# Patient Record
Sex: MALE | Race: WHITE
[De-identification: names, ages, dates, MRNs, and addresses within clinical notes are randomized per-mention and may not be internally consistent; named-entity substitution may affect disease eponyms.]

---

## 2017-04-30 ENCOUNTER — HOSPITAL ENCOUNTER (EMERGENCY)
Dept: HOSPITAL 76 - ED | Age: 37
Discharge: HOME | End: 2017-04-30
Payer: COMMERCIAL

## 2017-04-30 VITALS — DIASTOLIC BLOOD PRESSURE: 86 MMHG | SYSTOLIC BLOOD PRESSURE: 138 MMHG

## 2017-04-30 DIAGNOSIS — T23.101A: Primary | ICD-10-CM
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
## 2017-04-30 DIAGNOSIS — R03.0: ICD-10-CM

## 2017-04-30 PROCEDURE — 99283 EMERGENCY DEPT VISIT LOW MDM: CPT

## 2017-04-30 NOTE — ED PHYSICIAN DOCUMENTATION
PD HPI MAJOR BURN





- Stated complaint


Stated Complaint: RT HAND INJURY





- Chief complaint


Chief Complaint: Burn





- History obtained from


History obtained from: Patient





- History of Present Illness


Timing - onset: Other (Right-handed gentleman, active duty in the [employer], spilled 

hot soup on his hand about an hour ago and has severe pain.  No other injuries.

  He is up-to-date on tetanus.)





Review of Systems


Constitutional: reports: Reviewed and negative


Nose: reports: Reviewed and negative


Throat: reports: Reviewed and negative





PD PAST MEDICAL HISTORY





- Present Medications


Home Medications: 


 Ambulatory Orders











 Medication  Instructions  Recorded  Confirmed


 


HYDROcod/ACETAM 5/325 [Norco 5/325] 1 - 2 ea PO Q6H PRN #15 tablet 04/30/17 


 


Rizatriptan Benzoate [Maxalt] 10 mg PO 04/30/17 














- Allergies


Allergies/Adverse Reactions: 


 Allergies











Allergy/AdvReac Type Severity Reaction Status Date / Time


 


No Known Drug Allergies Allergy   Verified 04/30/17 21:22














PD ED PE NORMAL





- Vitals


Vital signs reviewed: Yes





- General


General: Alert and oriented X 3, No acute distress





- Extremities


Extremities: Other (Small areas of first degree burn along the dorsal hand and 

the third finger palmar side, nothing circumferential.  TBSA much less than 1%.)





- Neuro


Neuro: Alert and oriented X 3, Normal speech





- Psych


Psych: Normal mood, Normal affect





Results





- Vitals


Vitals: 


 Vital Signs - 24 hr











  04/30/17





  21:19


 


Temperature 36.3 C L


 


Heart Rate 88


 


Respiratory 16





Rate 


 


Blood Pressure 138/86 H


 


O2 Saturation 96








 Oxygen











O2 Source                      Room air

















Departure





- Departure


Disposition: 01 Home, Self Care


Clinical Impression: 


Burn of hand


Qualifiers:


 Encounter type: initial encounter Laterality: right Burn degree: first degree 

Qualified Code(s): T23.101A - Burn of first degree of right hand, unspecified 

site, initial encounter


Condition: Good


Record reviewed to determine appropriate education?: Yes


Instructions:  ED Burn D 1st


Prescriptions: 


HYDROcod/ACETAM 5/325 [Norco 5/325] 1 - 2 ea PO Q6H PRN #15 tablet


 PRN Reason: Pain


Comments: 


Go to [facility] medical tomorrow to check in with your PCM.  Return if worse.





Your blood pressure was elevated today on check in to the emergency department. 

This does not mean that you have hypertension, it is a common phenomenon to 

check into the emergency department and have elevated blood pressure. I 

recommend that you see your primary care physician within the week to have it 

rechecked when you're feeling better.





Do not drink or drive while on narcotic pain medicine.


Note that many narcotic pain relievers also contain tylenol/acetaminophen. 

Please ensure that your total dose of acetaminophen from all sources does not 

exceed 3 grams (3000mg) per day.


You may constipated on this medication, take a stool softener such as "Colace" 

twice a day while you are on it. Also recommend a over-the-counter laxative 

such as senna or MiraLAX any day that you do not have a bowel movement.


If you received narcotic pain medication in the emergency department, do not 

drive or operate machinery for the next 24 hours.

## 2017-05-31 ENCOUNTER — HOSPITAL ENCOUNTER (OUTPATIENT)
Dept: HOSPITAL 76 - DI | Age: 37
Discharge: HOME | End: 2017-05-31
Attending: STUDENT IN AN ORGANIZED HEALTH CARE EDUCATION/TRAINING PROGRAM
Payer: COMMERCIAL

## 2017-05-31 DIAGNOSIS — R01.1: Primary | ICD-10-CM

## 2017-05-31 DIAGNOSIS — Z85.47: ICD-10-CM

## 2017-05-31 PROCEDURE — 93306 TTE W/DOPPLER COMPLETE: CPT

## 2017-08-06 ENCOUNTER — HOSPITAL ENCOUNTER (EMERGENCY)
Dept: HOSPITAL 76 - ED | Age: 37
Discharge: HOME | End: 2017-08-06
Payer: COMMERCIAL

## 2017-08-06 ENCOUNTER — HOSPITAL ENCOUNTER (OUTPATIENT)
Dept: HOSPITAL 76 - EMS | Age: 37
Discharge: ANOTHER HOSPITAL | End: 2017-08-06
Attending: SURGERY
Payer: COMMERCIAL

## 2017-08-06 VITALS — SYSTOLIC BLOOD PRESSURE: 124 MMHG | DIASTOLIC BLOOD PRESSURE: 79 MMHG

## 2017-08-06 DIAGNOSIS — R07.2: Primary | ICD-10-CM

## 2017-08-06 DIAGNOSIS — R07.9: Primary | ICD-10-CM

## 2017-08-06 DIAGNOSIS — R06.02: ICD-10-CM

## 2017-08-06 DIAGNOSIS — R94.31: ICD-10-CM

## 2017-08-06 DIAGNOSIS — J84.10: ICD-10-CM

## 2017-08-06 LAB
ALBUMIN/GLOB SERPL: 1.5 {RATIO} (ref 1–2.2)
ANION GAP SERPL CALCULATED.4IONS-SCNC: 12 MMOL/L (ref 6–13)
BASOPHILS NFR BLD AUTO: 0.1 10^3/UL (ref 0–0.1)
BASOPHILS NFR BLD AUTO: 0.8 %
BILIRUB BLD-MCNC: 0.9 MG/DL (ref 0.2–1)
BUN SERPL-MCNC: 16 MG/DL (ref 6–20)
CALCIUM UR-MCNC: 8.9 MG/DL (ref 8.5–10.3)
CHLORIDE SERPL-SCNC: 108 MMOL/L (ref 101–111)
CO2 SERPL-SCNC: 18 MMOL/L (ref 21–32)
CREAT SERPLBLD-SCNC: 0.7 MG/DL (ref 0.6–1.2)
EOSINOPHIL # BLD AUTO: 0.1 10^3/UL (ref 0–0.7)
EOSINOPHIL NFR BLD AUTO: 0.5 %
ERYTHROCYTE [DISTWIDTH] IN BLOOD BY AUTOMATED COUNT: 12.9 % (ref 12–15)
GFRSERPLBLD MDRD-ARVRAT: 126 ML/MIN/{1.73_M2} (ref 89–?)
GLOBULIN SER-MCNC: 2.8 G/DL (ref 2.1–4.2)
GLUCOSE SERPL-MCNC: 94 MG/DL (ref 70–100)
HCT VFR BLD AUTO: 42.9 % (ref 42–52)
HGB UR QL STRIP: 14.9 G/DL (ref 14–18)
LIPASE SERPL-CCNC: 34 U/L (ref 22–51)
LYMPHOCYTES # SPEC AUTO: 2.4 10^3/UL (ref 1.5–3.5)
LYMPHOCYTES NFR BLD AUTO: 23.4 %
MAGNESIUM SERPL-MCNC: 1.9 MG/DL (ref 1.7–2.8)
MCH RBC QN AUTO: 30.5 PG (ref 27–31)
MCHC RBC AUTO-ENTMCNC: 34.8 G/DL (ref 32–36)
MCV RBC AUTO: 87.7 FL (ref 80–94)
MONOCYTES # BLD AUTO: 1.1 10^3/UL (ref 0–1)
MONOCYTES NFR BLD AUTO: 10.9 %
NEUTROPHILS # BLD AUTO: 6.6 10^3/UL (ref 1.5–6.6)
NEUTROPHILS # SNV AUTO: 10.3 X10^3/UL (ref 4.8–10.8)
NEUTROPHILS NFR BLD AUTO: 64.4 %
NRBC # BLD AUTO: 0 /100WBC
PDW BLD AUTO: 7.3 FL (ref 7.4–11.4)
POTASSIUM SERPL-SCNC: 3.5 MMOL/L (ref 3.5–5)
PROT SPEC-MCNC: 6.9 G/DL (ref 6.7–8.2)
RBC MAR: 4.88 10^6/UL (ref 4.7–6.1)
SODIUM SERPLBLD-SCNC: 138 MMOL/L (ref 135–145)
WBC # BLD: 10.3 X10^3/UL

## 2017-08-06 PROCEDURE — 85025 COMPLETE CBC W/AUTO DIFF WBC: CPT

## 2017-08-06 PROCEDURE — 96374 THER/PROPH/DIAG INJ IV PUSH: CPT

## 2017-08-06 PROCEDURE — 93005 ELECTROCARDIOGRAM TRACING: CPT

## 2017-08-06 PROCEDURE — 96375 TX/PRO/DX INJ NEW DRUG ADDON: CPT

## 2017-08-06 PROCEDURE — 80053 COMPREHEN METABOLIC PANEL: CPT

## 2017-08-06 PROCEDURE — 85379 FIBRIN DEGRADATION QUANT: CPT

## 2017-08-06 PROCEDURE — 84484 ASSAY OF TROPONIN QUANT: CPT

## 2017-08-06 PROCEDURE — 83735 ASSAY OF MAGNESIUM: CPT

## 2017-08-06 PROCEDURE — 80320 DRUG SCREEN QUANTALCOHOLS: CPT

## 2017-08-06 PROCEDURE — 83690 ASSAY OF LIPASE: CPT

## 2017-08-06 PROCEDURE — 99284 EMERGENCY DEPT VISIT MOD MDM: CPT

## 2017-08-06 PROCEDURE — 94640 AIRWAY INHALATION TREATMENT: CPT

## 2017-08-06 PROCEDURE — 36415 COLL VENOUS BLD VENIPUNCTURE: CPT

## 2017-08-06 PROCEDURE — 71020: CPT

## 2017-08-06 NOTE — ED PHYSICIAN DOCUMENTATION
PD HPI CHEST PAIN





- Stated complaint


Stated Complaint: CP





- Chief complaint


Chief Complaint: Resp





- History obtained from


History obtained from: Patient, EMS





- History of Present Illness


Timing - onset: How many hours ago (few), Today


Timing - onset during: Rest (he was sitting at home and felt onset of 

substernal chest pressure/pain along wiht dyspnea. Feeling similar to some 

dyspnea and pains he will get with his fibrosis, but much worse than prior.)


Timing - duration: Hours (few)


Timing - details: Abrupt onset, Still present, Waxing and waning


Quality: Pressure, Tightness.  No: Sharp, Tearing


Location: Substernal.  No: Epigastric


Radiation: No: Neck, Back, Abdominal


Improved by: No: Rest


Worsened by: Movement, Position.  No: Inspiration, Palpation


Associated symptoms: Shortness of air, Cough (which he has chronically).  No: 

Nausea, Vomiting, Feeling faint / dizzy, Palpitations


Similar symptoms before: Diagnosis (has dyspnea commonly due to fibrosis (from 

Bleomycin chemo years ago))


Recently seen: Not recently seen





Review of Systems


Constitutional: reports: Fatigue.  denies: Fever, Chills, Myalgias


Nose: denies: Rhinorrhea / runny nose, Congestion


Throat: denies: Sore throat


Respiratory: reports: Cough


GI: denies: Abdominal Pain, Nausea, Vomiting, Diarrhea


Skin: denies: Rash, Lesions


Neurologic: denies: Focal weakness, Numbness, Near syncope





PD PAST MEDICAL HISTORY





- Past Medical History


Past Medical History: Yes


Cardiovascular: None


Respiratory: Other


Neuro: Headache/migraine


Endocrine/Autoimmune: None


GI: None


: None


HEENT: None


Psych: None


Musculoskeletal: None


Derm: None


Other Past Medical History: pulmonary fibrosis





- Past Surgical History


Past Surgical History: Yes





- Present Medications


Home Medications: 


 Ambulatory Orders











 Medication  Instructions  Recorded  Confirmed


 


HYDROcod/ACETAM 5/325 [Norco 5/325] 1 - 2 ea PO Q6H PRN #15 tablet 04/30/17 


 


Rizatriptan Benzoate [Maxalt] 10 mg PO 04/30/17 


 


HYDROcod/ACETAM 5/325 [Norco 5/325] 1 tab PO Q6H PRN #15 tablet 08/06/17 














- Allergies


Allergies/Adverse Reactions: 


 Allergies











Allergy/AdvReac Type Severity Reaction Status Date / Time


 


No Known Drug Allergies Allergy   Verified 04/30/17 21:22














- Social History


Does the pt smoke?: No


Smoking Status: Never smoker


Does the pt drink ETOH?: Yes


Does the pt have substance abuse?: No





- Family History


Family history: denies: CAD, Aortic aneursym, Aortic dissection





- Immunizations


Immunizations are current?: Yes





PD ED PE NORMAL





- Vitals


Vital signs reviewed: Yes





- General


General: Alert and oriented X 3, Well developed/nourished





- HEENT


HEENT: Pharynx benign





- Neck


Neck: Supple, no meningeal sign, No adenopathy





- Cardiac


Cardiac: RRR, No murmur





- Respiratory


Respiratory: Clear bilaterally, Other (no chestwall tenderness)





- Abdomen


Abdomen: Soft, Non distended, Other (tender along old prior scar midabdomen, 

which he says is chronic since his surgery years ago. )





- Male 


Male : Deferred





- Rectal


Rectal: Deferred





- Back


Back: No CVA TTP





- Derm


Derm: Normal color, Warm and dry





- Extremities


Extremities: No tenderness to palpate, Normal ROM s pain, No edema, No calf 

tenderness / cord





- Neuro


Neuro: Alert and oriented X 3, No motor deficit, Normal speech





- Psych


Psych: Normal mood





Results





- Vitals


Vitals: 


 Vital Signs - 24 hr











  08/06/17 08/06/17 08/06/17





  01:46 02:39 03:00


 


Temperature 36.4 C L  


 


Heart Rate 111 H 87 108 H


 


Respiratory 22 20 22





Rate   


 


Blood Pressure 145/88 H 136/78 H 


 


O2 Saturation 96 95 














  08/06/17





  04:46


 


Temperature 


 


Heart Rate 101 H


 


Respiratory 20





Rate 


 


Blood Pressure 124/79


 


O2 Saturation 98








 Oxygen











O2 Source                      Room air


 


Oxygen Flow Rate               3

















- EKG (time done)


  ** 01:51


Rate: Rate (enter#) (97)


Rhythm: NSR


Axis: Normal


Intervals: Normal VT


QRS: Poor R wave progression


Ischemia: Normal ST segments.  No: ST elevation c/w ischemia, ST depression





- Labs


Labs: 


 Laboratory Tests











  08/06/17 08/06/17 08/06/17





  02:25 02:55 02:55


 


WBC   10.3 


 


RBC   4.88 


 


Hgb   14.9 


 


Hct   42.9 


 


MCV   87.7 


 


MCH   30.5 


 


MCHC   34.8 


 


RDW   12.9 


 


Plt Count   197 


 


MPV   7.3 L 


 


Neut #   6.6 


 


Lymph #   2.4 


 


Mono #   1.1 H 


 


Eos #   0.1 


 


Baso #   0.1 


 


Absolute Nucleated RBC   0.01 


 


Nucleated RBCs   0.0 


 


D-Dimer  < 200.0 L  


 


Sodium    138


 


Potassium    3.5


 


Chloride    108


 


Carbon Dioxide    18 L


 


Anion Gap    12.0


 


BUN    16


 


Creatinine    0.7


 


Estimated GFR (MDRD)    126


 


Glucose    94


 


Calcium    8.9


 


Magnesium    1.9


 


Total Bilirubin    0.9


 


AST    139 H


 


ALT    39


 


Alkaline Phosphatase    49


 


Troponin I   


 


Total Protein    6.9


 


Albumin    4.1


 


Globulin    2.8


 


Albumin/Globulin Ratio    1.5


 


Lipase    34


 


Ethyl Alcohol    175.7














  08/06/17





  02:55


 


WBC 


 


RBC 


 


Hgb 


 


Hct 


 


MCV 


 


MCH 


 


MCHC 


 


RDW 


 


Plt Count 


 


MPV 


 


Neut # 


 


Lymph # 


 


Mono # 


 


Eos # 


 


Baso # 


 


Absolute Nucleated RBC 


 


Nucleated RBCs 


 


D-Dimer 


 


Sodium 


 


Potassium 


 


Chloride 


 


Carbon Dioxide 


 


Anion Gap 


 


BUN 


 


Creatinine 


 


Estimated GFR (MDRD) 


 


Glucose 


 


Calcium 


 


Magnesium 


 


Total Bilirubin 


 


AST 


 


ALT 


 


Alkaline Phosphatase 


 


Troponin I  < 0.04


 


Total Protein 


 


Albumin 


 


Globulin 


 


Albumin/Globulin Ratio 


 


Lipase 


 


Ethyl Alcohol 














- Rads (name of study)


  ** chest


Radiology: Prelim report reviewed (normal)





PD MEDICAL DECISION MAKING





- ED course


Complexity details: reviewed results, re-evaluated patient (pain improved with 

Morphine for awhile, then returned. Hurts for him to sit up and walking 

increases the pain, twisting, but not just breathing. ), considered 

differential (No acute changes on ECG and troponin is normal at few hours after 

onset. CXR clear. No change with neb treatment in his dyspnea. Mylanta not 

helpful. Lipase is okay. D-dimer is negative. I don't have indication for 

threatening cause of the symptoms. ), d/w patient





Departure





- Departure


Disposition: 01 Home, Self Care


Clinical Impression: 


Chest pain


Qualifiers:


 Chest pain type: precordial pain Qualified Code(s): R07.2 - Precordial pain


Condition: Stable


Record reviewed to determine appropriate education?: Yes


Instructions:  ED Chest Pain Atypical Unkn Cause


Follow-Up: 


KISHA VO [Primary Care Provider] - 


Prescriptions: 


HYDROcod/ACETAM 5/325 [Norco 5/325] 1 tab PO Q6H PRN #15 tablet


 PRN Reason: Pain


Comments: 


No obvious threatening/dangerous cause of the pain, but I don't have the 

diagnosis per se. Tylenol or hydrocodone as needed for pain. Recheck with PMD 

if not improved over the next 1-2 days. Return to ED if worse or other symptoms 

develop along with it. 


Forms:  Activity restrictions


Discharge Date/Time: 08/06/17 04:46

## 2017-08-06 NOTE — XRAY PRELIMINARY REPORT
Accession: [id number]

Exam: XR Chest 2 View PA/LAT

 

IMPRESSION: Normal 2-view chest radiography.

 

[facility]

 

SITE ID: [id number]

## 2017-08-06 NOTE — XRAY REPORT
EXAM:

CHEST RADIOGRAPHY

 

EXAM DATE: 8/6/2017 02:34 AM.

 

CLINICAL HISTORY: Anterior chest pain.

 

COMPARISON: None.

 

TECHNIQUE: 2 views.

 

FINDINGS: 

Lungs/Pleura: No focal opacities evident. No pleural effusion. No pneumothorax. Normal volumes.

 

Mediastinum: Heart and mediastinal contours are unremarkable.

 

Other: None.

 

IMPRESSION: Normal 2-view chest radiography.

 

[facility]

Referring Provider Line: [phone]

 

SITE ID: [id number]

## 2017-09-28 ENCOUNTER — HOSPITAL ENCOUNTER (EMERGENCY)
Dept: HOSPITAL 76 - ED | Age: 37
Discharge: HOME | End: 2017-09-28
Payer: COMMERCIAL

## 2017-09-28 VITALS — DIASTOLIC BLOOD PRESSURE: 82 MMHG | SYSTOLIC BLOOD PRESSURE: 120 MMHG

## 2017-09-28 DIAGNOSIS — S40.021A: Primary | ICD-10-CM

## 2017-09-28 DIAGNOSIS — C62.90: ICD-10-CM
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
## 2017-09-28 DIAGNOSIS — J84.10: ICD-10-CM
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
## 2017-09-28 PROCEDURE — 99283 EMERGENCY DEPT VISIT LOW MDM: CPT

## 2017-09-28 PROCEDURE — 73000 X-RAY EXAM OF COLLAR BONE: CPT

## 2017-09-28 NOTE — XRAY REPORT
EXAM:

RIGHT CLAVICLE RADIOGRAPHY

 

EXAM DATE: 9/28/2017 05:49 AM.

 

CLINICAL HISTORY: Fall, right-sided clavicle pain.

 

COMPARISON: 08/06/2017.

 

TECHNIQUE: 2 views.

 

FINDINGS: 

Bones: Normal. No fracture or bone lesion.

 

Joints: The acromioclavicular and sternoclavicular joints are normal. No subluxation.

 

Soft Tissues: Normal. No soft tissue swelling.

 

IMPRESSION: Normal clavicle radiography.

 

[facility]

Referring Provider Line: [phone]

 

SITE ID: [id number]

## 2017-09-28 NOTE — XRAY PRELIMINARY REPORT
Accession: [id number]

Exam: XR Clavicle RT

 

IMPRESSION: Normal clavicle radiography.

 

[facility]

 

SITE ID: [id number]

## 2017-09-28 NOTE — ED PHYSICIAN DOCUMENTATION
PD HPI UPPER EXT INJURY





- Stated complaint


Stated Complaint: FALL





- Chief complaint


Chief Complaint: Ext Problem





- History obtained from


History obtained from: Patient





- History of Present Illness


Location: Right, Clavicle


Type of injury: Fall


Where injury occurred: Home


Timing - onset: How many minutes ago (30)


Timing - duration: Seconds


Timing - details: Abrupt onset


Improved by: Immobilization


Worsened by: Moving, Palpating


Associated symptoms: No: Numbness, Tingling


Similar symptoms before: Diagnosis


Recently seen: Not recently seen





- Additonal information


Additional information: 





Patient is a 37 year old male presenting to the emergency department for arm 

pain.  Patient states that he fell out of bed landing on the right side.  

Patient now has right sided clavicle pain





Review of Systems


Eyes: denies: Loss of vision


Ears: denies: Ear pain, Drainage/discharge


Nose: denies: Epistaxis


Throat: denies: Dental pain / toothache


Cardiac: denies: Chest pain / pressure


Respiratory: denies: Dyspnea


GI: denies: Nausea, Vomiting


Musculoskeletal: reports: Extremity pain.  denies: Extremity swelling, Joint 

swelling


Neurologic: denies: Focal weakness, Numbness, Headache, Head injury, LOC





PD PAST MEDICAL HISTORY





- Past Medical History


Past Medical History: Yes


Cardiovascular: None


Respiratory: Other


Neuro: Headache/migraine


Endocrine/Autoimmune: None


GI: None


: None


HEENT: None


Psych: None


Musculoskeletal: None


Derm: None


Other Past Medical History: Pulmonary fibrous, Testicular CA





- Past Surgical History


Past Surgical History: Yes





- Present Medications


Home Medications: 


 Ambulatory Orders











 Medication  Instructions  Recorded  Confirmed


 


Rizatriptan Benzoate [Maxalt] 10 mg PO DAILY PRN 04/30/17 09/28/17














- Allergies


Allergies/Adverse Reactions: 


 Allergies











Allergy/AdvReac Type Severity Reaction Status Date / Time


 


No Known Drug Allergies Allergy   Verified 09/28/17 05:21














- Social History


Does the pt smoke?: No


Smoking Status: Never smoker


Does the pt drink ETOH?: Yes


Does the pt have substance abuse?: No





- Immunizations


Immunizations are current?: Yes





- POLST


Patient has POLST: No





PD ED PE NORMAL





- Vitals


Vital signs reviewed: Yes





- General


General: Alert and oriented X 3, No acute distress





- HEENT


HEENT: Atraumatic, PERRL





- Neck


Neck: Supple, no meningeal sign, No bony TTP





- Cardiac


Cardiac: RRR, No murmur





- Respiratory


Respiratory: No respiratory distress





- Abdomen


Abdomen: Soft, Non tender, Non distended





- Derm


Derm: Normal color, Warm and dry, No rash





- Neuro


Neuro: Alert and oriented X 3, No sensory deficit, Normal speech





- Psych


Psych: Normal mood, Normal affect





PD ED PE EXPANDED





- Extremities


Extremities: Right shoulder (tenderness to palpation of right clavicle)





Results





- Vitals


Vitals: 


 Vital Signs - 24 hr











  09/28/17 09/28/17





  05:15 06:18


 


Temperature 36.4 C L 


 


Heart Rate 67 83


 


Respiratory 17 16





Rate  


 


Blood Pressure 150/96 H 120/82 H


 


O2 Saturation 100 97








 Oxygen











O2 Source                      Room air

















- Rads (name of study)


  ** clavicle x-ray


Radiology: Final report received (no acute fracture or dislocation)





PD MEDICAL DECISION MAKING





- ED course


Complexity details: reviewed old records, reviewed results, re-evaluated patient

, considered differential, d/w patient


ED course: 





Patient was seen and examined at bedside.  patient was treated with ibuprofen 

and sent for imaging.  When patient returned he was placed in a shoulder 

immobilizer.  Patient's x-ray showed no acute fracture or dislocation.  Patienr 

required no further work up and was stable for discharge with outpatient follow 

up.  





Departure





- Departure


Disposition: 01 Home, Self Care


Clinical Impression: 


 Contusion of right clavicle





Condition: Good


Instructions:  ED Contusion Upper Ext


Follow-Up: 


KISHA VO [Primary Care Provider] - As Needed


Comments: 


Your diagnostics today were within normal limits.  there was no acute fracture 

or dislocation.  You should ice your clavicle and take motrin or tylenol as 

needed for pain.  You can wear the sling for comfort but should not keep it on 

all the time to help prevent frozen shoulder.

## 2018-02-06 ENCOUNTER — HOSPITAL ENCOUNTER (OUTPATIENT)
Dept: HOSPITAL 76 - SDS | Age: 38
Discharge: HOME | End: 2018-02-06
Attending: SURGERY
Payer: COMMERCIAL

## 2018-02-06 VITALS — SYSTOLIC BLOOD PRESSURE: 137 MMHG | DIASTOLIC BLOOD PRESSURE: 92 MMHG

## 2018-02-06 DIAGNOSIS — K43.2: Primary | ICD-10-CM

## 2018-02-06 PROCEDURE — 49568: CPT

## 2018-02-06 PROCEDURE — 0WUF0JZ SUPPLEMENT ABDOMINAL WALL WITH SYNTHETIC SUBSTITUTE, OPEN APPROACH: ICD-10-PCS | Performed by: SURGERY

## 2018-02-06 PROCEDURE — 49560: CPT

## 2018-02-06 NOTE — OPERATIVE REPORT
Operative Report





- General


Procedure Date: 02/06/18


Planned Procedure: Incisional herniorrhaphy


Pre-Op Diagnosis: Incisional hernia


Procedure Performed: 


Incisional herniorrhaphy with mesh


Post Op Diagnosis: Incisional hernia





- Procedure Note


Primary Surgeon: Krzysztof Cornell MD


Anesthesia Provider: Yuliya Omalley CRNA


Anesthesia Technique: General LMA


IV Fluids (mL): 600


Estimated Blood Loss (mL): 5


Complications: 


None.





- Other


Other Information/Narrative: 


OPERATIVE DESCRIPTION/REPORT:





After verbal and written informed consent was obtained detailing the risks of 

infection, bleeding requiring transfusion with its risks, nerve injury, and 

death, and after I met with the patient confirming the surgery and the site of 

the surgery, the patient was brought to the operative suite and placed supine 

on the operating table.  Great care was taken to avoid pressure points to 

prevent pressure necrosis or nerve injury.  Monitoring devices were applied 

along with TEDs and pneumatic compressive stockings (to prevent DVT). The 

patient received preoperative antibiotics for surgical prophylaxis.  Yuliya Omalley CRNA sedated and anethetized the patient for the entire procedure.   The 

patient was prepped and draped in the usual sterile manner.  With the patient 

draped my initials were clearly visible.  A "time in" then confirmed that the 

patient was identified with 3 identifiers (name, birth date and medical record 

number), the history and physical was in the chart, the signed consent 

confirming the procedure was in the chart, the patient was in the correct 

position, the aforementioned prophylactic measures were in place or given, we 

had the correct personnel and equipment to complete the procedure and that 

anesthesia, surgery and nursing were given an opportunity to express any 

concerns.  With the agreement of everyone in the room, we proceeded with the 

operation.





A vertical midline incision was made overlying the mass and dissection was 

carried down to the hernia sac using a combination of Metzenbaum scissors, 

scalpel, and Bovie electrocautery.  The sac was cleared of overlying adherent 

tissue, and the fascial defect was delineated.  The fascia was cleared of any 

adherent tissue for a distance 1.5 cm from the defect.  The sac was resected 

using a combination of Metzenbaum scissors and Bovie electrocautery.  The 

defect was closed using a 1.7 inch Ventralex ST Hernia Patch (Ref#[id number], Lot#

[id number], use by 2019-12-28) sewing it in place using interrupted 2-0 PDS.  The 

fascial edges were then sewn over the mesh using interrupted 2-0 PDS.  The 

subcutaneous tissues were copiously irrigated, and then closed using an 

interrupted 2-0 Vicryl.  The fascia, subcutaneous tissues and skin were 

injected with 1/2% marcaine for long term anesthetic control.





Meticulous hemostasis was obtained using Bovie electrocautery.  The skin 

incision was approximated with a running 4-0 Monocryl. At this point a time out 

was performed that confirmed that all the counts were correct, the procedure 

that was performed, the blood loss, the IV fluids administered, and the patient

s condition.  Having tolerated the procedure well, the patient was subsequently 

extubated and taken to recovery room in good and stable condition.





Dragon disclaimer:





This document was created in part using voice recognition technology.  Because 

of the inherent limitations of the system ([employer]'s Dragon Dictate user manual 

states that the licensee understands that speech recognition is a statistical 

process and that recognition errors are inherent in the process), occasional 

same sounding word substitutions and grammatical errors do occur and persist 

despite proofreading.  Please read this document for context.